# Patient Record
Sex: MALE | Race: WHITE | Employment: UNEMPLOYED | URBAN - METROPOLITAN AREA
[De-identification: names, ages, dates, MRNs, and addresses within clinical notes are randomized per-mention and may not be internally consistent; named-entity substitution may affect disease eponyms.]

---

## 2021-04-15 ENCOUNTER — OFFICE VISIT (OUTPATIENT)
Dept: INTERNAL MEDICINE CLINIC | Age: 36
End: 2021-04-15

## 2021-04-15 VITALS
HEIGHT: 69 IN | DIASTOLIC BLOOD PRESSURE: 80 MMHG | SYSTOLIC BLOOD PRESSURE: 132 MMHG | RESPIRATION RATE: 16 BRPM | HEART RATE: 89 BPM | OXYGEN SATURATION: 98 % | TEMPERATURE: 98.6 F | WEIGHT: 168 LBS | BODY MASS INDEX: 24.88 KG/M2

## 2021-04-15 DIAGNOSIS — M25.562 ACUTE PAIN OF LEFT KNEE: ICD-10-CM

## 2021-04-15 DIAGNOSIS — M25.562 PATELLOFEMORAL ARTHRALGIA OF LEFT KNEE: Primary | ICD-10-CM

## 2021-04-15 PROCEDURE — 99214 OFFICE O/P EST MOD 30 MIN: CPT | Performed by: FAMILY MEDICINE

## 2021-04-15 PROCEDURE — 73564 X-RAY EXAM KNEE 4 OR MORE: CPT | Performed by: FAMILY MEDICINE

## 2021-04-15 NOTE — PROGRESS NOTES
Chief Complaint   Patient presents with    Knee Pain     Patient is here for knee pain      he is a 38y.o. year old male who presents for evaluation of knee pain   Pain Assessment Encounter      Patrick William  4/15/2021  Onset of Symptoms: Patient states he has had knee pain for 8weeks   ________________________________________________________________________  Description:Patient states he think he injured knees while doing yoga     Frequency: 4-5 times a day  Pain Scale:(1-10): 4  Trauma Hx: none   Hx of similar symptoms: No:   Radiation: YES, leg, knee and thigh  Duration:  continuous      Progression: has worsened  What makes it better?: heat, ice, lying down and OTC meds  What makes it worse?:exercise, strecthing and walking  Medications tried: acetaminophen, ibuprofen, aspirin    Reviewed and agree with Nurse Note and duplicated in this note. Reviewed PmHx, RxHx, FmHx, SocHx, AllgHx and updated and dated in the chart.     Family History   Problem Relation Age of Onset    No Known Problems Mother     Heart Disease Father     No Known Problems Sister     No Known Problems Brother     No Known Problems Sister     No Known Problems Sister        Past Medical History:   Diagnosis Date    Depression       Social History     Socioeconomic History    Marital status: SINGLE     Spouse name: Not on file    Number of children: Not on file    Years of education: Not on file    Highest education level: Not on file   Tobacco Use    Smoking status: Never Smoker    Smokeless tobacco: Never Used   Substance and Sexual Activity    Alcohol use: Yes     Comment: occasionally    Drug use: No    Sexual activity: Yes     Partners: Female        Review of Systems - negative except as listed above      Objective:     Vitals:    04/15/21 1144   BP: 132/80   Pulse: 89   Resp: 16   Temp: 98.6 °F (37 °C)   SpO2: 98%   Weight: 168 lb (76.2 kg)   Height: 5' 9\" (1.753 m)       Physical Examination: General appearance - alert, well appearing, and in no distress  Back exam - full range of motion, no tenderness, palpable spasm or pain on motion  Neurological - alert, oriented, normal speech, no focal findings or movement disorder noted  Musculoskeletal - right knee exam:  The patient'sright Knee  is  normal to inspection. The ROM is normal and there is flexion to Normal Effusion is: absent The joint exhibits Negative warmth and Crepitus is: moderate. The Halle test is:  negative Joint Line Tenderness is  negative . The [facility] Test is negative  and the Lachman is  negative. The Anterior Drawer is negative. The Posterior Drawer is:  negative. Valgus Stress (for MCL) is:  normal . Varus Stress (for LCL) is  normal  . The Jere Test is negative and the Apprehension Sign:  negative  Patellar Grind positive and Tracking is lateralizing   Extremities - peripheral pulses normal, no pedal edema, no clubbing or cyanosis  Skin - normal coloration and turgor, no rashes, no suspicious skin lesions noted   Indications for study: left knee pain  Limited musculoskeletal ultrasound examination was performed on the anterior left knee with the following findings: Quadriceps tendon - long:  normal echogenic, linearly compact fibrillar appearance   Quadriceps tendon - trans:  normal echogenic, tessellate compact fibrillar pancake-like appearance   Suprapatellar recess - long: no effusion; normal appearing suprapatellar fat pads   Patellar tendon - long:  normal echogenic, linearly compact fibrillar appearance; normal appearing Hoffa fat pad  Patellar tendon - trans:  normal echogenic, tessellate compact fibrillar pancake-like appearance     Impression: Normal left knee    Scanned and Interpreted by Helen Lux MD [facility]      Assessment/ Plan:   Diagnoses and all orders for this visit:    1. Patellofemoral arthralgia of left knee  -     REFERRAL TO PHYSICAL THERAPY  -     AMB POC US,EXTREMITY,NONVASCULAR,REAL-TIME IMAGE,LIMITED    2.  Acute pain of left knee  -     XR KNEE LT MIN 4 V; Future         Pathophysiology, recovery and rehabilitation process discussed and questions answered   Counseling for 30 Minutes of the total visit duration   Pictures and figures used as necessary   Provided reassurance   Handouts provided and reviewed with patient for PFPS  Monitor response to Physical Therapy   Recommend activity modification   Recommend  lower impact activities-walking, Eliptical, Nordic Track, cycling or swimming   Follow up in 4 week(s)             1) Remember to stay active and/or exercise regularly (I suggest 30-45 minutes daily)   2) For reliable dietary information, go to www. EATRIGHT.org. You may wish to consider seeing the nutritionist at [facility] [phone], also consider the [address] I have discussed the diagnosis with the patient and the intended plan as seen in the above orders. The patient has received an after-visit summary and questions were answered concerning future plans. Medication Side Effects and Warnings were discussed with patient,  Patient Labs were reviewed and or requested, and  Patient Past Records were reviewed and or requested  yes      Pt agrees to call or return to clinic and/or go to closest ER with any worsening of symptoms. This may include, but not limited to increased fever (>100.4) with NSAIDS or Tylenol, increased edema, confusion, rash, worsening of presenting symptoms. Please note that this dictation was completed with [employer], the computer voice recognition software. Quite often unanticipated grammatical, syntax, homophones, and other interpretive errors are inadvertently transcribed by the computer software. Please disregard these errors. Please excuse any errors that have escaped final proofreading. Thank you.

## 2021-04-21 ENCOUNTER — HOSPITAL ENCOUNTER (OUTPATIENT)
Dept: PHYSICAL THERAPY | Age: 36
Discharge: HOME | End: 2021-04-21

## 2021-04-21 DIAGNOSIS — M25.562 PATELLOFEMORAL ARTHRALGIA OF LEFT KNEE: ICD-10-CM

## 2021-04-21 PROCEDURE — 97161 PT EVAL LOW COMPLEX 20 MIN: CPT | Performed by: PHYSICAL THERAPIST

## 2021-04-21 PROCEDURE — 97110 THERAPEUTIC EXERCISES: CPT | Performed by: PHYSICAL THERAPIST

## 2021-04-21 NOTE — PROGRESS NOTES
PT INITIAL EVALUATION NOTE 2-15    Patient Name: Amanda Richter  Date:2021  : 1985  [x]  Patient  Verified  Payor: /    In time:1244p  Out time:135p  Total Treatment Time (min): 46  Visit #: 1     Treatment Area: Patellofemoral arthralgia of left knee [M25.562]    SUBJECTIVE  Pain Level (0-10 scale): 2-3  Any medication changes, allergies to medications, adverse drug reactions, diagnosis change, or new procedure performed?: [] No    [x] Yes (see summary sheet for update)  Subjective:     Patient reports with L knee pain that dates about 2 months. Says that at the time he was doing a lot of yoga at the time and thinks he was doing a little too much. He notices issues with working, yoga, walking the dog    He finds hot yoga to be a major stress relief, and not being able to do it as much has been a big issue for him. Doing a little too much leaning with the warrior pose, or eagle pose. He works at [employer] and does often do a fair amount of standing and squatting at work and notices some issues at work. Description of symptoms: posterior knee pain, often anterior knee and can radiate up the quad muscle as well  Aggravating Factors: yoga  Alleviating Factors: brace (sometimes)    Patient reports functional limitations with: yoga, dog walking, working, squatting    OBJECTIVE/EXAMINATION  Posture:  Normal standing posture, possible mild genu valgum  Gait: mild limp on L LE, moving into femoral adduction with gait cycle  Functional Mobility:  Squat: forward knees, mild valgus, no pain  SL squat: moves into mild-moderate femoral adduction/IR bilaterally  Palpation: TTP over lateral patellar border.  Increased turgor over lateral quad on L    L Knee AROM WFLs     L Ankle DF: ~10deg     Joint Mobility Assessment: Patellar position: lateral patellar tilt, L>R    Flexibility: HS 90/90: -30deg bilaterally  Prone quad stretch: normal mobility, good stretch    LOWER QUARTER   MUSCLE STRENGTH  KEY       R  L  0 - No Contraction  Knee ext  5  5  1 - Trace            flex  4+  4+  2 - Poor   Hip ext   4+  4+  3 - Fair          flex   5  5  4 - Good         abd  --  4-  5 - Normal         ER   --  4+    MMT: see above  Neurological: Reflexes / Sensations: nt  Special Tests: Frank: (+) for mild tightness     Jere: (+) for mild tightness      Knee Varus/Valgus Stress: (-)  Knee Lachmans: (-)      Halle's (-)*    Clarkes: (+)  * crepitus noted in patello-femoral joint on L    10 min Therapeutic Exercise:  [x] See flow sheet :   Rationale: increase ROM and increase strength to improve the patients ability to do yoga without pain          With   [] TE   [] TA   [] Neuro   [] SC   [] other: Patient Education: [x] Review HEP    [] Progressed/Changed HEP based on:   [] positioning   [] body mechanics   [] transfers   [] heat/ice application    [] other:        Other Objective/Functional Measures: FOTO Functional Measure: 49/100    Pain Level (0-10 scale) post treatment: 2    ASSESSMENT:      [x]  See Plan of Anupam Linn PT, DPT 4/21/2021

## 2021-04-21 NOTE — PROGRESS NOTES
Physical Therapy at [facility],   a part of [facility]  [address] [address], [address] [place] [address], [address] [place] [address]  Phone: [phone]  Fax: [phone]    Plan of Care/Statement of Necessity for Physical Therapy Services  2-15    Patient name: Lonnie Colunga  : 1985  Provider#: [id number]  Referral source: Mikey Nyhan, MD      Medical/Treatment Diagnosis: Patellofemoral arthralgia of left knee [M25.562]     Prior Hospitalization: see medical history     Comorbidities: none reported  Prior Level of Function: able to participate in yoga, walk dog, work without pain  Medications: Verified on Patient Summary List    Start of Care: 21      Onset Date: 2021       The Plan of Care and following information is based on the information from the initial evaluation. Assessment/ key information: Patient reports with symptoms consistent with L patello-femoral pain syndrome. Noted some muscle tightness/imbalances, as well as some hip/glut weakness that will be addressed with PT.      Evaluation Complexity History LOW Complexity : Zero comorbidities / personal factors that will impact the outcome / POC; Examination HIGH Complexity : 4+ Standardized tests and measures addressing body structure, function, activity limitation and / or participation in recreation  ;Presentation LOW Complexity : Stable, uncomplicated  ;Clinical Decision Making MEDIUM Complexity : FOTO score of 26-74  Overall Complexity Rating: LOW     Problem List: pain affecting function, decrease ROM, decrease strength, impaired gait/ balance, decrease ADL/ functional abilitiies, decrease activity tolerance and decrease flexibility/ joint mobility   Treatment Plan may include any combination of the following: Therapeutic exercise, Therapeutic activities, Neuromuscular re-education, Physical agent/modality, Manual therapy, Patient education and Self Care training  Patient / Family readiness to learn indicated by: asking questions and interest  Persons(s) to be included in education: patient (P)  Barriers to Learning/Limitations: None  Patient Goal (s): train knee to get back on track  Patient Self Reported Health Status: good  Rehabilitation Potential: good    Long Term Goals: To be accomplished in 10-12 treatments:   1. Pt will be able to walk his dog without knee pain   2. Pt will be able to work throughout the day without knee pain   3. Pt will be able to return to yoga training without increase in knee pain   4. Pt will be able to self-manage care using updated HEP for improved independence   5. Improved FOTO score to 75 or better to demonstrate improved function  Frequency / Duration: Patient to be seen 1-2 times per week for 10-12 treatments. Patient/ Caregiver education and instruction: self care, activity modification and exercises    [x]  Plan of care has been reviewed with KIAH Laughlin PT, DPT 4/21/2021     ________________________________________________________________________    I certify that the above Therapy Services are being furnished while the patient is under my care. I agree with the treatment plan and certify that this therapy is necessary.     [de-identified] Signature:____________________  Date:____________Time: _________      Lauren Hardy MD

## 2021-04-29 ENCOUNTER — HOSPITAL ENCOUNTER (OUTPATIENT)
Dept: PHYSICAL THERAPY | Age: 36
Discharge: HOME | End: 2021-04-29

## 2021-04-29 PROCEDURE — 97110 THERAPEUTIC EXERCISES: CPT

## 2021-04-29 PROCEDURE — 97140 MANUAL THERAPY 1/> REGIONS: CPT

## 2021-04-29 NOTE — PROGRESS NOTES
PT DAILY TREATMENT NOTE - [facility] 2-15    Patient Name: Amanda Richter  Date:2021  : 1985  [x]  Patient  Verified  Payor: /    In time:8:45A  Out time:10:04 A  Total Treatment Time (min): 79  Total Timed Codes (min): 69  1:1 Treatment Time ( only): --   Visit #:  2    Treatment Area: Left knee pain [M25.562]    SUBJECTIVE  Pain Level (0-10 scale): 2-3/10  Any medication changes, allergies to medications, adverse drug reactions, diagnosis change, or new procedure performed?: [x] No    [] Yes (see summary sheet for update)  Subjective functional status/changes:   [] No changes reported  Patient said another friend  yesterday, and he automatically felt pain in his knee. He also said that is did not sleep because of this last night. He said his pain is not as high today though, and that he hasn't been keeping up with his exercises but wants to. The Student Physical Therapist Assistant participated in the delivery of services under the direction of Nathalie James OPTA; directing the service, making the skilled judgment, and who is responsible for the supervision of treatment. OBJECTIVE    Modality rationale: decrease pain and increase tissue extensibility to improve the patients ability to decrease pain with washing dishes.    Min Type Additional Details       [] Estim: []Att   []Unatt    []TENS instruct                  []IFC  []Premod   []NMES                     []Other:  []w/US   []w/ice   []w/heat  Position:  Location:       []  Traction: [] Cervical       []Lumbar                       [] Prone          []Supine                       []Intermittent   []Continuous Lbs:  [] before manual  [] after manual  []w/heat    []  Ultrasound: []Continuous   [] Pulsed                       at: []1MHz   []3MHz Location:  W/cm2:    [] Paraffin         Location:   []w/heat   10 []  Ice     [x]  Heat  []  Ice massage Position:supine  Location:left knee/it band    []  Laser  []  Other: Position:  Location:      []  Vasopneumatic Device Pressure:       [] lo [] med [] hi   Temperature:      [x] Skin assessment post-treatment:  [x]intact []redness- no adverse reaction    []redness  adverse reaction:     46 min Therapeutic Exercise:  [x] See flow sheet :Passive hamstring and piriformis stretch to left   Rationale: increase ROM, increase strength, improve coordination, improve balance and increase proprioception to improve the patients ability to increase strength in gluts to decrease pain in left knee. 23 min Manual Therapy: MFR/STM to left popliteal/medial knee, superior/inferior/medial patella mobs grade II, therastick to left It band/proximal hamstrings/gluts   Rationale: decrease pain, increase ROM, increase tissue extensibility and decrease trigger points to improve the patients ability to decrease pain with yoga. With   [] TE   [] TA   [] Neuro   [] SC   [] other: Patient Education: [x] Review HEP    [] Progressed/Changed HEP based on:   [] positioning   [] body mechanics   [] transfers   [] heat/ice application    [] other:      Other Objective/Functional Measures: --     Pain Level (0-10 scale) post treatment: 0/10     ASSESSMENT/Changes in Function:   Patient tolerated treatment well today. Added in total gym and side stepping to continue to progress glut activation and strength in close chain exercises. Patient reported feeling a lot better after physical therapy session today. Patient will continue to benefit from skilled PT services to modify and progress therapeutic interventions, address functional mobility deficits, address ROM deficits, address strength deficits, analyze and address soft tissue restrictions, analyze and cue movement patterns, analyze and modify body mechanics/ergonomics and assess and modify postural abnormalities to attain remaining goals.      []  See Plan of Care  []  See progress note/recertification  []  See Discharge Summary         Progress towards goals / Updated goals:    Long Term Goals:  To be accomplished in 10-12 treatments:               1. Pt will be able to walk his dog without knee pain               2. Pt will be able to work throughout the day without knee pain               3. Pt will be able to return to yoga training without increase in knee pain               4. Pt will be able to self-manage care using updated HEP for improved independence               5. Improved FOTO score to 75 or better to demonstrate improved function  Frequency / Duration: Patient to be seen 1-2 times per week for 10-12 treatments    PLAN  [x]  Upgrade activities as tolerated     [x]  Continue plan of care  []  Update interventions per flow sheet       []  Discharge due to:_  []  Other:_      MAYRA Modi 4/29/2021

## 2021-05-06 ENCOUNTER — HOSPITAL ENCOUNTER (OUTPATIENT)
Dept: PHYSICAL THERAPY | Age: 36
Discharge: HOME | End: 2021-05-06

## 2021-05-06 PROCEDURE — 97110 THERAPEUTIC EXERCISES: CPT | Performed by: PHYSICAL THERAPIST

## 2021-05-06 PROCEDURE — 97140 MANUAL THERAPY 1/> REGIONS: CPT | Performed by: PHYSICAL THERAPIST

## 2021-05-06 NOTE — PROGRESS NOTES
PT DAILY TREATMENT NOTE 2-15    Patient Name: David Living  Date:2021  : 1985  [x]  Patient  Verified  Payor: /    In time: 465P  Out time: 1040a  Total Treatment Time (min): 70  Visit #:  3    Treatment Area: Left knee pain [M25.562]    SUBJECTIVE  Pain Level (0-10 scale): 0  Any medication changes, allergies to medications, adverse drug reactions, diagnosis change, or new procedure performed?: [x] No    [] Yes (see summary sheet for update)  Subjective functional status/changes:   [] No changes reported  Has had a rough week with his friend's . Hasn't had enough time     OBJECTIVE    Modality rationale: decrease pain and increase tissue extensibility to improve the patients ability to decrease pain with washing dishes. Min Type Additional Details        []? Estim: []? Att   []? Unatt    []? TENS instruct                  []?IFC  []? Premod   []? NMES                     []?Other:  []?w/US   []?w/ice   []?w/heat  Position:  Location:        []? Traction: []? Cervical       []? Lumbar                       []? Prone          []? Supine                       []?Intermittent   []? Continuous Lbs:  []? before manual  []? after manual  []?w/heat     []? Ultrasound: []? Continuous   []? Pulsed                       at: []?1MHz   []? 3MHz Location:  W/cm2:     []? Paraffin         Location:   []?w/heat   10 []? Ice     [x]? Heat  []? Ice massage Position:supine  Location:left knee/it band     []? Laser  []? Other: Position:  Location:        []? Vasopneumatic Device Pressure:       []? lo []? med []? hi   Temperature:       [x]? Skin assessment post-treatment:  [x]? intact []? redness- no adverse reaction    []? redness  adverse reaction:      45 min Therapeutic Exercise:  [x]?  See flow sheet :Passive hamstring and piriformis stretch to left   Rationale: increase ROM, increase strength, improve coordination, improve balance and increase proprioception to improve the patients ability to increase strength in gluts to decrease pain in left knee.     15 min Manual Therapy: MFR/STM to left lateral quad/IT band, superior/inferior/medial patella mobs grade III-IV   Rationale: decrease pain, increase ROM, increase tissue extensibility and decrease trigger points to improve the patients ability to decrease pain with yoga.                                                                    With   []? TE   []? TA   []? Neuro   []? SC   []? other: Patient Education: [x]? Review HEP    []? Progressed/Changed HEP based on:   []? positioning   []? body mechanics   []? transfers   []? heat/ice application    []? other:       Other Objective/Functional Measures: --        Pain Level (0-10 scale) post treatment: 0/10      ASSESSMENT/Changes in Function:   Did well with interventions today. No pain with exercises. Did require cueing in order to adjust knee position to avoid valgus position with TG squats. Patient will continue to benefit from skilled PT services to modify and progress therapeutic interventions, address functional mobility deficits, address ROM deficits, address strength deficits, analyze and address soft tissue restrictions, analyze and cue movement patterns, analyze and modify body mechanics/ergonomics and assess and modify postural abnormalities to attain remaining goals. []? See Plan of Care  []? See progress note/recertification  []? See Discharge Summary         Progress towards goals / Updated goals:     Long Term Goals: To be accomplished in 10-12 treatments:               1. Pt will be able to walk his dog without knee pain               2. Pt will be able to work throughout the day without knee pain MILD PROGRESS               3. Pt will be able to return to yoga training without increase in knee pain               4. Pt will be able to self-manage care using updated HEP for improved independence               5.  Improved FOTO score to 75 or better to demonstrate improved function  Frequency / Duration: Patient to be seen 1-2 times per week for 10-12 treatments     PLAN  [x]? Upgrade activities as tolerated     [x]? Continue plan of care  []? Update interventions per flow sheet       []? Discharge due to:_  []?   Other:_      Dalton Cash, PT, DPT 5/6/2021

## 2021-05-13 ENCOUNTER — HOSPITAL ENCOUNTER (OUTPATIENT)
Dept: PHYSICAL THERAPY | Age: 36
Discharge: HOME | End: 2021-05-13

## 2021-05-13 PROCEDURE — 97140 MANUAL THERAPY 1/> REGIONS: CPT | Performed by: PHYSICAL THERAPIST

## 2021-05-13 PROCEDURE — 97110 THERAPEUTIC EXERCISES: CPT | Performed by: PHYSICAL THERAPIST

## 2021-05-13 NOTE — PROGRESS NOTES
PT DAILY TREATMENT NOTE 2-15    Patient Name: Colton Calderon  Date:2021  : 1985  [x]  Patient  Verified  Payor: /    In time: 270A  Out time: 1045a  Total Treatment Time (min): 71  Visit #:  4    Treatment Area: Left knee pain [M25.562]    SUBJECTIVE  Pain Level (0-10 scale): 0  Any medication changes, allergies to medications, adverse drug reactions, diagnosis change, or new procedure performed?: [x] No    [] Yes (see summary sheet for update)  Subjective functional status/changes:   [] No changes reported  Knee is is feeling better overall. Less stress, but hasn't been doing his exercises at home. Feels like he hasn't been taking care of himself like he needs to. Frustrated with his job. Reports this year has been tough for him. OBJECTIVE           Modality rationale: decrease pain and increase tissue extensibility to improve the patients ability to decrease pain with washing dishes.     Min Type Additional Details        []? ? Estim: []??Att   []? ? Unatt    []? ?TENS instruct                  []? ?IFC  []? ?Premod   []? ?NMES                     []? ? Other:  []??w/US   []? ?w/ice   []? ?w/heat  Position:  Location:        []? ?  Traction: []?? Cervical       []? ?Lumbar                       []? ? Prone          []? ?Supine                       []? ?Intermittent   []? ? Continuous Lbs:  []?? before manual  []? ? after manual  []? ?w/heat     []? ?  Ultrasound: []??Continuous   []? ? Pulsed                       LI: []??1MHz   []? ? 3MHz Location:  W/cm2:     []? ? Paraffin         Location:   []??w/heat   10 []??  Ice     [x]? ?  Heat  []? ?  Ice massage Position:supine  Location:left knee/it band     []? ?  Laser  []? ?  Other: Position:  Location:        []? ?  Vasopneumatic Device Pressure:       []? ? lo []? ? med []?? hi   Temperature:       [x]? ? Skin assessment post-treatment:  [x]? ? intact []? ?redness- no adverse reaction    []? ?redness  adverse reaction:      45 min Therapeutic Exercise:  [x]? ? See flow sheet :Passive hamstring and piriformis stretch to left   Rationale: increase ROM, increase strength, improve coordination, improve balance and increase proprioception to improve the patients ability to increase strength in gluts to decrease pain in left knee.     14 min Manual Therapy: MFR/STM to left lateral quad/IT band, superior/inferior/medial patella mobs grade III-IV   Rationale: decrease pain, increase ROM, increase tissue extensibility and decrease trigger points to improve the patients ability to decrease pain with yoga.                                                                   With   []?? TE   []?? TA   []? ? Neuro   []?? SC   []?? other: Patient Education: [x]? ? Review HEP    []? ? Progressed/Changed HEP based on:   []?? positioning   []? ? body mechanics   []? ? transfers   []? ? heat/ice application    []? ? other:       Other Objective/Functional Measures: --        Pain Level (0-10 scale) post treatment: 0/10      ASSESSMENT/Changes in Function:   Did well today. Discussed his non-compliance with home exercises. He has been more depressed recently, and his motivation has not been there. He is ashwini about this, and we set a goal for him to do 3 sessions of home based yoga, of at least 10-15 minutes each, in order to work himself back into a normal routine as yoga has historically been a source of stress relief for him. Patient will continue to benefit from skilled PT services to modify and progress therapeutic interventions, address functional mobility deficits, address ROM deficits, address strength deficits, analyze and address soft tissue restrictions, analyze and cue movement patterns, analyze and modify body mechanics/ergonomics and assess and modify postural abnormalities to attain remaining goals.     []? ?  See Plan of Care  []? ?  See progress note/recertification  []? ?  See Discharge Summary         Progress towards goals / Updated goals:     Long Term Goals: To be accomplished in 10-12 treatments:               1. Pt will be able to walk his dog without knee pain               2. Pt will be able to work throughout the day without knee pain MILD PROGRESS               3. Pt will be able to return to yoga training without increase in knee pain               4. Pt will be able to self-manage care using updated HEP for improved independence               5. Improved FOTO score to 75 or better to demonstrate improved function  Frequency / Duration: Patient to be seen 1-2 times per week for 10-12 treatments     PLAN  [x]? ?  Upgrade activities as tolerated     [x]? ?  Continue plan of care  []? ?  Update interventions per flow sheet       []? ?  Discharge due to:_  []??  Other:_        Rosezetta Romberg, PT, DPT 5/13/2021

## 2021-05-20 ENCOUNTER — HOSPITAL ENCOUNTER (OUTPATIENT)
Dept: PHYSICAL THERAPY | Age: 36
Discharge: HOME | End: 2021-05-20

## 2021-05-20 PROCEDURE — 97140 MANUAL THERAPY 1/> REGIONS: CPT | Performed by: PHYSICAL THERAPIST

## 2021-05-20 PROCEDURE — 97110 THERAPEUTIC EXERCISES: CPT | Performed by: PHYSICAL THERAPIST

## 2021-05-20 NOTE — PROGRESS NOTES
PT DAILY TREATMENT NOTE 2-15    Patient Name: Mira Galindo  Date:2021  : 1985  [x]  Patient  Verified  Payor: /    In time: 295D  Out time: 1040a  Total Treatment Time (min): 59  Visit #:  5    Treatment Area: Left knee pain [M25.562]    SUBJECTIVE  Pain Level (0-10 scale): 0  Any medication changes, allergies to medications, adverse drug reactions, diagnosis change, or new procedure performed?: [x] No    [] Yes (see summary sheet for update)  Subjective functional status/changes:   [] No changes reported  Knee is doing well. He has been better with doing his home program over the last week. OBJECTIVE            Modality rationale: decrease pain and increase tissue extensibility to improve the patients ability to decrease pain with washing dishes.     Min Type Additional Details        []??? Estim: []? ? ? Att   []? ??Unatt    []? ??TENS instruct                  []? ??IFC  []? ? ?Premod   []? ??NMES                     []? ??Other:  []???w/US   []? ??w/ice   []? ??w/heat  Position:  Location:        []? ??  Traction: []??? Cervical       []? ? ?Lumbar                       []? ?? Prone          []? ? ?Supine                       []? ? ? Intermittent   []? ?? Continuous Lbs:  []??? before manual  []? ?? after manual  []? ??w/heat     []? ??  Ultrasound: []? ? ? Continuous   []? ?? Pulsed                       at: []???1MHz   []? ??3MHz Location:  W/cm2:     []??? Paraffin         Location:   []???w/heat   10 []???  Ice     [x]? ??  Heat  []? ??  Ice massage Position:supine  Location:left knee/it band     []? ??  Laser  []? ??  Other: Position:  Location:        []? ??  Vasopneumatic Device Pressure:       []? ?? lo []? ?? med []? ?? hi   Temperature:        [x]? ?? Skin assessment post-treatment:  [x]? ??intact []? ??redness- no adverse reaction    []? ??redness  adverse reaction:       44 min Therapeutic Exercise:  [x]? ?? See flow sheet :Passive hamstring and piriformis stretch to left   Rationale: increase ROM, increase strength, improve coordination, improve balance and increase proprioception to improve the patients ability to increase strength in gluts to decrease pain in left knee.     10 min Manual Therapy: MFR/STM to left lateral quad/IT band, superior/inferior/medial patella mobs grade III-IV   Rationale: decrease pain, increase ROM, increase tissue extensibility and decrease trigger points to improve the patients ability to decrease pain with yoga.                                                                   With   []??? TE   []??? TA   []??? Neuro   []??? SC   []? ?? other: Patient Education: [x]??? Review HEP    []? ?? Progressed/Changed HEP based on:   []??? positioning   []? ?? body mechanics   []? ?? transfers   []? ?? heat/ice application    []? ?? other:       Other Objective/Functional Measures: --        Pain Level (0-10 scale) post treatment: 0/10      ASSESSMENT/Changes in Function:   Doing better this week with better compliance with HEP. Patient will continue to benefit from skilled PT services to modify and progress therapeutic interventions, address functional mobility deficits, address ROM deficits, address strength deficits, analyze and address soft tissue restrictions, analyze and cue movement patterns, analyze and modify body mechanics/ergonomics and assess and modify postural abnormalities to attain remaining goals.     []? ??  See Plan of Care  []? ??  See progress note/recertification  []? ??  See Discharge Summary         Progress towards goals / Updated goals:     Long Term Goals: To be accomplished in 10-12 treatments:               1. Pt will be able to walk his dog without knee pain               2. Pt will be able to work throughout the day without knee pain MILD PROGRESS               3. Pt will be able to return to yoga training without increase in knee pain               4. Pt will be able to self-manage care using updated HEP for improved independence               5.  Improved FOTO score to 75 or better to demonstrate improved function  Frequency / Duration: Patient to be seen 1-2 times per week for 10-12 treatments     PLAN  [x]???  Upgrade activities as tolerated     [x]? ??  Continue plan of care  []? ??  Update interventions per flow sheet       []???  Discharge due to:_  []???  Other:_        Manisha Pritchett, PT, DPT 5/20/2021

## 2021-05-25 ENCOUNTER — HOSPITAL ENCOUNTER (OUTPATIENT)
Dept: PHYSICAL THERAPY | Age: 36
Discharge: HOME | End: 2021-05-25

## 2021-05-25 PROCEDURE — 97110 THERAPEUTIC EXERCISES: CPT | Performed by: PHYSICAL THERAPIST

## 2021-05-25 PROCEDURE — 97140 MANUAL THERAPY 1/> REGIONS: CPT | Performed by: PHYSICAL THERAPIST

## 2021-05-25 NOTE — PROGRESS NOTES
PT DAILY TREATMENT NOTE 2-15    Patient Name: Wilma Nieves  Date:2021  : 1985  [x]  Patient  Verified  Payor: /    In time: 5a  Out time: 900a  Total Treatment Time (min): 71  Visit #: 6    Treatment Area: Left knee pain [M25.562]    SUBJECTIVE  Pain Level (0-10 scale): 0  Any medication changes, allergies to medications, adverse drug reactions, diagnosis change, or new procedure performed?: [x] No    [] Yes (see summary sheet for update)  Subjective functional status/changes:   [] No changes reported  Doing fairly well. OBJECTIVE    Modality rationale: decrease pain and increase tissue extensibility to improve the patients ability to decrease pain with washing dishes.     Min Type Additional Details        []???? Estim: []?? ?? Att   []????Unatt    []????TENS instruct                  []????IFC  []????Premod   []????NMES                     []?? ??Other:  []????w/US   []? ???w/ice   []? ???w/heat  Position:  Location:        []????  Traction: []???? Cervical       []????Lumbar                       []???? Prone          []????Supine                       []?? ?? Intermittent   []???? Continuous Lbs:  []???? before manual  []???? after manual  []? ???w/heat     []????  Ultrasound: []???? Continuous   []???? Pulsed                       at: []????1MHz   []????3MHz Location:  W/cm2:     []???? Paraffin         Location:   []????w/heat   10 []????  Ice     [x]? ???  Heat  []????  Ice massage Position:supine  Location:left knee/it band     []????  Laser  []????  Other: Position:  Location:        []????  Vasopneumatic Device Pressure:       []???? lo []???? med []???? hi   Temperature:        [x]? ??? Skin assessment post-treatment:  [x]? ???intact []? ???redness- no adverse reaction    []? ???redness  adverse reaction:       49 min Therapeutic Exercise:  [x]? ??? See flow sheet :   Rationale: increase ROM, increase strength, improve coordination, improve balance and increase proprioception to improve the patients ability to increase strength in gluts to decrease pain in left knee.     10 min Manual Therapy: MFR/STM to left lateral quad/IT band, superior/inferior/medial patella mobs grade III-IV   Rationale: decrease pain, increase ROM, increase tissue extensibility and decrease trigger points to improve the patients ability to decrease pain with yoga.                                                                   With   []???? TE   []???? TA   []???? Neuro   []???? SC   []???? other: Patient Education: [x]???? Review HEP    []???? Progressed/Changed HEP based on:   []???? positioning   []???? body mechanics   []???? transfers   []???? heat/ice application    []???? other:       Other Objective/Functional Measures: --       Pain Level (0-10 scale) post treatment: 0/10      ASSESSMENT/Changes in Function:   []????  See Plan of Care  [x]????  See progress note/recertification  []????  See Discharge Summary           PLAN  [x]????  Upgrade activities as tolerated     [x]? ???  Continue plan of care  []????  Update interventions per flow sheet       []????  Discharge due to:_  []????  Other:_        Peng Tom PT, DPT 5/25/2021

## 2021-05-25 NOTE — PROGRESS NOTES
Physical Therapy at [facility],   a part of [address] [place] [address]  [address] [address], [address] [place] [address], [facility]  Phone: [phone] Fax: [phone]    Progress Note    Name: Heather Lopez   : 1985   MD: Rj Salazar MD       Treatment Diagnosis: Left knee pain [M25.562]  Start of Care: 21    Visits from Start of Care: 5  Missed Visits: 0    Summary of Care: Mr. Kelsey Coleman has been making good overall progress thus far with PT to address his patello-femoral pain. He has been more compliant his his home stretching/exercise program in the last few weeks, which has led directly to reduced pain levels. Have focused treatments on release of IT band tightness, as well as hip and functional knee strengthening. Long Term Goals: To be accomplished in 10-12 treatments:               1. Pt will be able to walk his dog without knee pain PROGRESSING               2. Pt will be able to work throughout the day without knee pain MILD PROGRESS               3. Pt will be able to return to yoga training without increase in knee pain PROGRESSING AT HOME               4. Pt will be able to self-manage care using updated HEP for improved independence PROGRESSING               5. Improved FOTO score to 75 or better to demonstrate improved function     Assessment / Recommendations:    Other: Would continue to benefit from PT for further 3-4 weeks in order to promote pain free activity levels including working without pain    Scot Austin, PT, DPT 2021

## 2021-06-10 ENCOUNTER — HOSPITAL ENCOUNTER (EMERGENCY)
Age: 36
Discharge: HOME | End: 2021-06-10
Attending: EMERGENCY MEDICINE

## 2021-06-10 VITALS
SYSTOLIC BLOOD PRESSURE: 126 MMHG | RESPIRATION RATE: 18 BRPM | DIASTOLIC BLOOD PRESSURE: 85 MMHG | HEART RATE: 103 BPM | OXYGEN SATURATION: 98 % | TEMPERATURE: 99 F

## 2021-06-10 DIAGNOSIS — R45.4 ANGER REACTION: Primary | ICD-10-CM

## 2021-06-10 PROCEDURE — 99285 EMERGENCY DEPT VISIT HI MDM: CPT

## 2021-06-10 PROCEDURE — 99284 EMERGENCY DEPT VISIT MOD MDM: CPT

## 2021-06-10 NOTE — ED NOTES
Patient refused to be registered in the system because he said he was not going to pay this bill. Unable to print discharge paperwork because patient was not registered. Patient left without paperwork.

## 2021-06-10 NOTE — ED TRIAGE NOTES
Patient arrived as [facility] accompanied by [employer]. [address] [place] [address] is in route to evaluate patient. Per officer \"he received a bill from the hospital and was very upset, threatened to kill himself and made statement that he would kill police if they were called. \"  No aggressive behavior reported by Police on scene. Patient is calm and cooperative.

## 2021-06-10 NOTE — ED PROVIDER NOTES
HPI  .  Patient reports that this is his day off. He apparently lives with his parents. 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 He received a $3000 medical bill unexpectedly and became very angry and frustrated. He denies suicidal ideation. Nursing triage note says that he threatened to harm police if they were called. He is denying wanting to harm anyone including police officers. Cristina SALMERON came and saw the patient just before I saw him a few minutes ago and says he does not meet criteria for involuntary admission. Past Medical History:   Diagnosis Date    Depression        History reviewed. No pertinent surgical history. Family History:   Problem Relation Age of Onset    No Known Problems Mother     Heart Disease Father     No Known Problems Sister     No Known Problems Brother     No Known Problems Sister     No Known Problems Sister        Social History     Socioeconomic History    Marital status: SINGLE     Spouse name: Not on file    Number of children: Not on file    Years of education: Not on file    Highest education level: Not on file   Occupational History    Not on file   Tobacco Use    Smoking status: Never Smoker    Smokeless tobacco: Never Used   Substance and Sexual Activity    Alcohol use: Yes     Comment: occasionally    Drug use: No    Sexual activity: Yes     Partners: Female   Other Topics Concern    Not on file   Social History Narrative    Not on file     Social Determinants of Health     Financial Resource Strain:     Difficulty of Paying Living Expenses:    Food Insecurity:     Worried About Running Out of Food in the Last Year:     Ran Out of Food in the Last Year:    Transportation Needs:     Lack of Transportation (Medical):      Lack of Transportation (Non-Medical):    Physical Activity:     Days of Exercise per Week:     Minutes of Exercise per Session:    Stress:     Feeling of Stress :    Social Connections:     Frequency of Communication with Friends and Family:     Frequency of Social Gatherings with Friends and Family:     Attends Caoda[employer] Services:     Active Member of Clubs or Organizations:     Attends Club or Organization Meetings:     Marital Status:    Intimate Partner Violence:     Fear of Current or Ex-Partner:     Emotionally Abused:     Physically Abused:     Sexually Abused: ALLERGIES: Patient has no known allergies. Review of Systems   All other systems reviewed and are negative. Vitals:    06/10/21 1819   BP: (!) 146/84   Pulse: (!) 123   Resp: 20   Temp: 99.2 °F (37.3 °C)   SpO2: 95%            Physical Exam  Vitals and nursing note reviewed. Constitutional:       Appearance: He is well-developed. HENT:      Head: Normocephalic and atraumatic. Eyes:      Pupils: Pupils are equal, round, and reactive to light. Cardiovascular:      Rate and Rhythm: Normal rate and regular rhythm. Heart sounds: Normal heart sounds. No murmur heard. No friction rub. No gallop. Pulmonary:      Effort: Pulmonary effort is normal. No respiratory distress. Breath sounds: No wheezing or rales. Abdominal:      Palpations: Abdomen is soft. Tenderness: There is no abdominal tenderness. There is no rebound. Musculoskeletal:         General: No tenderness. Normal range of motion. Cervical back: Normal range of motion and neck supple. Skin:     Findings: No erythema. Neurological:      Mental Status: He is alert. Cranial Nerves: No cranial nerve deficit.       Comments: Motor; symmetric   Psychiatric:         Behavior: Behavior normal.      Comments: Affect; normal; not angry; behavior; polite, and cooperative; cognition normal          MDM       Procedures

## 2021-08-24 DIAGNOSIS — F41.8 DEPRESSION WITH ANXIETY: ICD-10-CM

## 2021-08-24 DIAGNOSIS — E55.9 VITAMIN D DEFICIENCY: ICD-10-CM

## 2021-08-24 RX ORDER — ERGOCALCIFEROL 1.25 MG/1
CAPSULE ORAL
Qty: 16 CAPSULE | Refills: 0 | OUTPATIENT
Start: 2021-08-24

## 2021-08-24 RX ORDER — HYDROXYZINE PAMOATE 50 MG/1
50 CAPSULE ORAL
Qty: 30 CAPSULE | Refills: 2 | Status: SHIPPED | OUTPATIENT
Start: 2021-08-24

## 2021-08-25 ENCOUNTER — TELEPHONE (OUTPATIENT)
Dept: INTERNAL MEDICINE CLINIC | Age: 36
End: 2021-08-25

## 2021-08-25 NOTE — TELEPHONE ENCOUNTER
----- Message from Rosemary Wang sent at 8/24/2021  3:51 PM EDT -----  Regarding: Dr. Shelby Garcia first and last name: pt      Reason for call: pt would like a nurse to call him back      Callback required yes/no and why: yes      Best contact number(s): [phone]      Details to clarify the request:  Patient would like to know why the Ergocalciferol was denied. Pt stated he has been told he has a Vitamin D deficiency but then the medication was denied. Pt is requesting for a nurse to call him back and please explain.         Rosemary Wang

## 2021-11-04 NOTE — PROGRESS NOTES
Physical Therapy at [facility],   a part of [address] [place], [place] [address] [address], [address] [place] [address], [address]  Phone: [phone]  Fax: [phone]    Discharge Summary  2-15    Patient name: Nir Pina  : 1985  Provider#: [id number]  Referral source: Emelia Man MD      Medical/Treatment Diagnosis: Left knee pain [M25.562]     Prior Hospitalization: see medical history     Comorbidities: none reported  Prior Level of Function: able to participate in yoga, walk dog, work without pain  Medications: Verified on Patient Summary List     Start of Care: 21                                                                     Onset Date: 2021          Visits from Start of Care: 6     Missed Visits: 0  Reporting Period : 21 to 21    ASSESSMENT/SUMMARY OF CARE: Mr. Ivis Machuca made good progress over the course of 6 sessions addressing his patello-femoral knee pain. He was doing better with his home exercise program which significantly helped reduce pain in between sessions. Was not able to continue with PT beyond his 21 appointment and will be discharged at this time. Long Term Goals: To be accomplished in 10-12 treatments:               1. Pt will be able to walk his dog without knee pain PROGRESSING               2. Pt will be able to work throughout the day without knee pain MILD PROGRESS               3. Pt will be able to return to yoga training without increase in knee pain PROGRESSING AT HOME               4. Pt will be able to self-manage care using updated HEP for improved independence PROGRESSING               5.  Improved FOTO score to 75 or better to demonstrate improved function     RECOMMENDATIONS:  [x]Discontinue therapy: [x]Patient has reached or is progressing toward set goals/financial constraints      []Patient is non-compliant or has abdicated      []Due to lack of appreciable progress towards set goals    Teja Obregon Arnoldo Gaspar, DPT 11/4/2021

## 2023-05-20 RX ORDER — ERGOCALCIFEROL 1.25 MG/1
CAPSULE ORAL
COMMUNITY
Start: 2015-11-30

## 2023-05-20 RX ORDER — HYDROXYZINE PAMOATE 50 MG/1
50 CAPSULE ORAL 3 TIMES DAILY PRN
COMMUNITY
Start: 2021-08-24

## 2023-05-20 RX ORDER — IMIQUIMOD 12.5 MG/.25G
1 CREAM TOPICAL
COMMUNITY
Start: 2015-12-20

## 2024-04-10 ENCOUNTER — HOSPITAL ENCOUNTER (OUTPATIENT)
Facility: HOSPITAL | Age: 39
Setting detail: SPECIMEN
Discharge: HOME | End: 2024-04-13

## 2024-04-10 PROCEDURE — 36415 COLL VENOUS BLD VENIPUNCTURE: CPT

## 2024-04-10 PROCEDURE — 83036 HEMOGLOBIN GLYCOSYLATED A1C: CPT

## 2024-04-10 PROCEDURE — 80053 COMPREHEN METABOLIC PANEL: CPT

## 2024-04-10 PROCEDURE — 80061 LIPID PANEL: CPT

## 2024-04-10 PROCEDURE — 85025 COMPLETE CBC W/AUTO DIFF WBC: CPT

## 2024-04-11 LAB
ALBUMIN SERPL-MCNC: 4.2 G/DL (ref 3.5–5)
ALBUMIN/GLOB SERPL: 1.4 (ref 1.1–2.2)
ALP SERPL-CCNC: 61 U/L (ref 45–117)
ALT SERPL-CCNC: 38 U/L (ref 12–78)
ANION GAP SERPL CALC-SCNC: 5 MMOL/L (ref 5–15)
AST SERPL-CCNC: 26 U/L (ref 15–37)
BASOPHILS # BLD: 0 K/UL (ref 0–0.1)
BASOPHILS NFR BLD: 0 % (ref 0–1)
BILIRUB SERPL-MCNC: 0.4 MG/DL (ref 0.2–1)
BUN SERPL-MCNC: 12 MG/DL (ref 6–20)
BUN/CREAT SERPL: 15 (ref 12–20)
CALCIUM SERPL-MCNC: 9.8 MG/DL (ref 8.5–10.1)
CHLORIDE SERPL-SCNC: 110 MMOL/L (ref 97–108)
CHOLEST SERPL-MCNC: 214 MG/DL
CO2 SERPL-SCNC: 25 MMOL/L (ref 21–32)
CREAT SERPL-MCNC: 0.8 MG/DL (ref 0.7–1.3)
DIFFERENTIAL METHOD BLD: NORMAL
EOSINOPHIL # BLD: 0.2 K/UL (ref 0–0.4)
EOSINOPHIL NFR BLD: 2 % (ref 0–7)
ERYTHROCYTE [DISTWIDTH] IN BLOOD BY AUTOMATED COUNT: 12.5 % (ref 11.5–14.5)
EST. AVERAGE GLUCOSE BLD GHB EST-MCNC: 108 MG/DL
GLOBULIN SER CALC-MCNC: 3 G/DL (ref 2–4)
GLUCOSE SERPL-MCNC: 87 MG/DL (ref 65–100)
HBA1C MFR BLD: 5.4 % (ref 4–5.6)
HCT VFR BLD AUTO: 45 % (ref 36.6–50.3)
HDLC SERPL-MCNC: 56 MG/DL
HDLC SERPL: 3.8 (ref 0–5)
HGB BLD-MCNC: 15.1 G/DL (ref 12.1–17)
IMM GRANULOCYTES # BLD AUTO: 0 K/UL (ref 0–0.04)
IMM GRANULOCYTES NFR BLD AUTO: 0 % (ref 0–0.5)
LDLC SERPL CALC-MCNC: 118.8 MG/DL (ref 0–100)
LYMPHOCYTES # BLD: 2.6 K/UL (ref 0.8–3.5)
LYMPHOCYTES NFR BLD: 39 % (ref 12–49)
MCH RBC QN AUTO: 30.9 PG (ref 26–34)
MCHC RBC AUTO-ENTMCNC: 33.6 G/DL (ref 30–36.5)
MCV RBC AUTO: 92.2 FL (ref 80–99)
MONOCYTES # BLD: 0.4 K/UL (ref 0–1)
MONOCYTES NFR BLD: 6 % (ref 5–13)
NEUTS SEG # BLD: 3.6 K/UL (ref 1.8–8)
NEUTS SEG NFR BLD: 53 % (ref 32–75)
NRBC # BLD: 0 K/UL (ref 0–0.01)
NRBC BLD-RTO: 0 PER 100 WBC
PLATELET # BLD AUTO: 286 K/UL (ref 150–400)
PMV BLD AUTO: 10.2 FL (ref 8.9–12.9)
POTASSIUM SERPL-SCNC: 4.4 MMOL/L (ref 3.5–5.1)
PROT SERPL-MCNC: 7.2 G/DL (ref 6.4–8.2)
RBC # BLD AUTO: 4.88 M/UL (ref 4.1–5.7)
SODIUM SERPL-SCNC: 140 MMOL/L (ref 136–145)
TRIGL SERPL-MCNC: 196 MG/DL
VLDLC SERPL CALC-MCNC: 39.2 MG/DL
WBC # BLD AUTO: 6.8 K/UL (ref 4.1–11.1)